# Patient Record
Sex: FEMALE | Race: WHITE | URBAN - METROPOLITAN AREA
[De-identification: names, ages, dates, MRNs, and addresses within clinical notes are randomized per-mention and may not be internally consistent; named-entity substitution may affect disease eponyms.]

---

## 2024-03-12 ENCOUNTER — HOME HEALTH ADMISSION (OUTPATIENT)
Dept: HOME HEALTH SERVICES | Facility: HOME HEALTH | Age: 89
End: 2024-03-12
Payer: MEDICARE

## 2024-03-13 ENCOUNTER — HOME CARE VISIT (OUTPATIENT)
Dept: SCHEDULING | Facility: HOME HEALTH | Age: 89
End: 2024-03-13

## 2024-03-13 PROCEDURE — G0299 HHS/HOSPICE OF RN EA 15 MIN: HCPCS

## 2024-03-13 PROCEDURE — 0221000100 HH NO PAY CLAIM PROCEDURE

## 2024-03-14 ENCOUNTER — HOME CARE VISIT (OUTPATIENT)
Dept: HOME HEALTH SERVICES | Facility: HOME HEALTH | Age: 89
End: 2024-03-14

## 2024-03-14 VITALS
HEART RATE: 78 BPM | OXYGEN SATURATION: 97 % | TEMPERATURE: 97.1 F | RESPIRATION RATE: 16 BRPM | DIASTOLIC BLOOD PRESSURE: 62 MMHG | SYSTOLIC BLOOD PRESSURE: 110 MMHG

## 2024-03-14 ASSESSMENT — ENCOUNTER SYMPTOMS: DYSPNEA ACTIVITY LEVEL: AFTER AMBULATING 10 - 20 FT

## 2024-03-15 NOTE — HOME HEALTH
Reason for the visit:SOC    History of present illness:Pt admitted to hospital Nov 2023 due to shingles.  During hospital stay pt diagnosed with pneumonia, dysphagia, transferred to ICU.  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  Pt has a Peg tube to LLQ with feeding tube connected during SN visit.  Daughter is a nurse.  Daughter started feeding last night due to SNF did not order in time for 2pm feeding.  Daughter states no problems with feedings or flushes.      Chief complaint and PMH:Weakness PMH: HTN, CKD stage 4, Anxiety, Breast CA, GERD, hernia    General description of the patient:Pt is 88 female who uses walker to ambulate.  Pt lives with daughter.      Assessment, plan and focus of care:    Patient alert & oriented x 4, VS all wnl, denies any increased shob, any changes in medications, open wounds or rashes, any falls since returning home.  Denies any problems/questions at this time. No s/s of distress during SN visit.  SN assessments, disease/medication/safety teaching/management    Caregiver involvement: Daughter    Medication reconciled and all medications are available.     Reason for homebound status: leaving home would require a considerable and taxing effort    Patient education provided this visit:    SN educated patient on Admission process, [facility] program    Patient/cg response to education:Patient verbalizes understanding via the teach back method.    PCP/Next PCP appointment:    Continues to need nursing care for: continued SN assessments, disease/medication/safety teaching/management    The following discharge planning was discussed with the patient/ patient's caregiver: DC when goals met

## 2024-03-18 ENCOUNTER — HOME CARE VISIT (OUTPATIENT)
Dept: SCHEDULING | Facility: HOME HEALTH | Age: 89
End: 2024-03-18

## 2024-03-18 VITALS
HEART RATE: 98 BPM | DIASTOLIC BLOOD PRESSURE: 68 MMHG | TEMPERATURE: 97 F | RESPIRATION RATE: 18 BRPM | SYSTOLIC BLOOD PRESSURE: 109 MMHG

## 2024-03-18 PROCEDURE — G0152 HHCP-SERV OF OT,EA 15 MIN: HCPCS

## 2024-03-21 ENCOUNTER — HOME CARE VISIT (OUTPATIENT)
Dept: SCHEDULING | Facility: HOME HEALTH | Age: 89
End: 2024-03-21

## 2024-03-21 VITALS
RESPIRATION RATE: 16 BRPM | SYSTOLIC BLOOD PRESSURE: 122 MMHG | DIASTOLIC BLOOD PRESSURE: 60 MMHG | OXYGEN SATURATION: 97 % | TEMPERATURE: 97.5 F | HEART RATE: 78 BPM

## 2024-03-21 PROCEDURE — G0299 HHS/HOSPICE OF RN EA 15 MIN: HCPCS

## 2024-03-22 ASSESSMENT — ENCOUNTER SYMPTOMS: DYSPNEA ACTIVITY LEVEL: AFTER AMBULATING 10 - 20 FT

## 2024-03-22 NOTE — HOME HEALTH
SN for skilled assessments, disease and medication management/teaching.  VS all wnl, denies any increased shob, falls, any new open wounds, problems/questions at this time.  Medications reconciled, denies any changes in medication since last SN visit.  No s/s of distress during SN visit.  Aware and approves of next scheduled SN visit. Plan for next visit:  medication/disease teaching/management. SN instructed on home safety, fall precautions and [facility] procedure,  Verbalizes understanding via the teach back method. Progressing towards goals, see care plan documentation.  The following discharge planning was discussed with the patient/caregiver: home care agency discharge to be completed when goals met. Patient in agreement. This patient remains homebound. See Homebound Status within Visit Record

## 2024-03-27 ENCOUNTER — HOME CARE VISIT (OUTPATIENT)
Dept: SCHEDULING | Facility: HOME HEALTH | Age: 89
End: 2024-03-27
Payer: MEDICARE

## 2024-03-27 VITALS
TEMPERATURE: 98 F | OXYGEN SATURATION: 93 % | DIASTOLIC BLOOD PRESSURE: 62 MMHG | RESPIRATION RATE: 18 BRPM | SYSTOLIC BLOOD PRESSURE: 120 MMHG | HEART RATE: 80 BPM

## 2024-03-27 PROCEDURE — G0300 HHS/HOSPICE OF LPN EA 15 MIN: HCPCS

## 2024-03-27 ASSESSMENT — ENCOUNTER SYMPTOMS
HEMOPTYSIS: 0
PAIN LOCATION - PAIN QUALITY: ACHY

## 2024-03-27 NOTE — HOME HEALTH
Patient with new gastrointestinal condition  Pt is NPO, has a new peg tube, daughter able to manage w/o difficulty, tube insertion site unremarkable, daughter changes dsg daily, pt c/o left knee pain and swelling yesterday, she states today there is no pain but the swelling remains, no redness, no open areas, cool to touch, pt has an appointment with PCP next week for left knee steroid injection d/t chronic osteoarthritis, sn encouraged daughter to move up appointment, daughter will do so.  Caregiver involvement: pt lives with daughter  Medications reconciled and all medications are available in the home  Home health supplies by type and quantity ordered/delivered this visit include: n/a  Patient education provided this visit: SN educated patient and patient's caregiver on infection prevention, safety precautions, pain control  Patient and patient caregiver verbalizes understanding via the teach back method.   Progress toward goals: progressing well  Home exercise program: OT  Plan for next visit: GI assessment  The following discharge planning was discussed with the patient/ patient's caregiver: DC when goals met

## 2024-03-29 ENCOUNTER — HOME CARE VISIT (OUTPATIENT)
Dept: SCHEDULING | Facility: HOME HEALTH | Age: 89
End: 2024-03-29
Payer: MEDICARE

## 2024-03-29 NOTE — HOME HEALTH
The patient  missed a routine visit for OT on 3/29/24  due to OT unable to get ahold of pt. OT spoke to daughter on 3/25/24 and she said she would get back to OT to schedule visit this week. OT left 3 more messages throughout the week to schedule eval but no return call.   The physician office was not contacted via phone but the MISSED VISIT note is being sent via fax/mail and is recorded in the MEDIA tab.

## 2024-04-01 ENCOUNTER — HOME CARE VISIT (OUTPATIENT)
Dept: SCHEDULING | Facility: HOME HEALTH | Age: 89
End: 2024-04-01
Payer: MEDICARE

## 2024-04-01 PROCEDURE — G0152 HHCP-SERV OF OT,EA 15 MIN: HCPCS

## 2024-04-04 ENCOUNTER — HOME CARE VISIT (OUTPATIENT)
Dept: SCHEDULING | Facility: HOME HEALTH | Age: 89
End: 2024-04-04
Payer: MEDICARE

## 2024-04-05 NOTE — HOME HEALTH
Pt was seen for OT evaluation only, unable to get ahold of pt last week and as per SN pt  is to be admitted to hospice.  Pt DC from OT with no visit .

## 2024-04-09 NOTE — HOME HEALTH
Pt discharged from  due to pt is enrolled in Hospice as of 4/4/24.  [place] questions answered based on this SN last visit 3/21/24